# Patient Record
Sex: MALE | Race: OTHER | URBAN - METROPOLITAN AREA
[De-identification: names, ages, dates, MRNs, and addresses within clinical notes are randomized per-mention and may not be internally consistent; named-entity substitution may affect disease eponyms.]

---

## 2017-03-22 NOTE — PATIENT DISCUSSION
Pinguecula Counseling:  I have explained to the patient at length the diagnosis of pinguecula and its pathophysiology. I recommended the patient adequately protect their eyes from excessive UV light and dry, marti conditions. The use of artificial tears in dry conditions was encouraged. Return for follow-up as scheduled.

## 2017-03-22 NOTE — PATIENT DISCUSSION
Pterygium Counseling:  I have explained the diagnosis of pterygium and its pathophysiology. Pterygia are relatively common in the general population and typically have little effect on the vision and eye itself. If the lesion increases in size, it may cause visual symptoms due to induced astigmatism or direct encroachment onto the visual axis. I recommended that the patient use artificial tears to relieve any dryness associated with the pterygium and to increase UV protection. If the pterygium becomes inflamed, uncomfortable or is cosmetically unacceptable, then surgery can be performed to remove the growth. Also, if the pterygium has caused astigmatism, the growth may need to be removed prior to cataract testing and surgery. The recurrence rate after surgery has been explained to the patient along with the need for pathological examination of the pterygium. Return for follow-up as scheduled.

## 2020-01-29 NOTE — PATIENT DISCUSSION
BLEPHARITIS, OU: PRESCRIBE WARM COMPRESSES AND EYELID SCRUBS QD-BID, ARTIFICIAL TEARS BID-QID. RETURN FOR FOLLOW-UP AS SCHEDULED.

## 2021-01-12 NOTE — PATIENT DISCUSSION
Pinguecula Counseling:  I have explained to the patient the diagnosis of pinguecula and its pathophysiology. I recommended the patient adequately protect their eyes from excessive UV light and dry, marti conditions. The use of artificial tears in dry conditions was encouraged. Return for follow-up as scheduled.

## 2021-01-25 NOTE — PATIENT DISCUSSION
CATARACTS, OU - WORSENING/VISUALLY SIGNIFICANT. SCHEDULE _OS_ FIRST THEN LATER IN _OD_ DISCUSSED OPTION OF _STANDARD OU___VS ___PANOPTIX OU_____. PATIENT UNDERSTANDS AND DESIRES ____TO LET US KNOW____.

## 2021-01-25 NOTE — PATIENT DISCUSSION
Surgery Counseling: I have discussed the option of scheduling surgery versus following, as well as the risks, benefits and alternatives of cataract surgery with the patient. It was explained that the surgery is medically indicated at this time, and it can be performed at the patient's option as delaying will cause no further deterioration, therefore there is no rush and there is no harm in waiting to have surgery. It was also explained that there is no guarantee that removing the cataract will improve their vision. The patient understands and desires to proceed with cataract surgery with the implantation of an intraocular lens to improve vision for _WATCHING TV_____. I have given the patient the prescribed regimen of the all-in-one drop to use before and after cataract surgery. They have elected to use the all-in-one option of Pred/Gati/Brom(prednisolone acetate,gatifloxacin,and bromfenac. Patient to administer as directed.

## 2021-02-22 NOTE — PATIENT DISCUSSION
Pre-Op 2nd Eye Counseling: The patient has noticed an improvement in their visual symptoms in the operative eye. The patient complains of decreased vision in the fellow eye when ___DRIVING AT NIGHT____. It was explained to the patient that the decision to proceed with cataract surgery in the fellow eye is entirely a separate decision from the surgical eye. All of the same risks, benefits and alternatives are reviewed with the patient again. The patient does feel the vision in the non-operative eye is limiting their daily activities and elects to proceed with cataract surgery in the ___RIGHT____ eye. . I have given the patient the prescribed regimen of  drops to use before and after cataract surgery. Patient to administer as directed.

## 2021-04-22 ENCOUNTER — IMPORTED ENCOUNTER (OUTPATIENT)
Dept: URBAN - METROPOLITAN AREA CLINIC 9 | Facility: CLINIC | Age: 71
End: 2021-04-22

## 2021-05-04 ENCOUNTER — IMPORTED ENCOUNTER (OUTPATIENT)
Dept: URBAN - METROPOLITAN AREA CLINIC 9 | Facility: CLINIC | Age: 71
End: 2021-05-04

## 2021-05-26 ENCOUNTER — IMPORTED ENCOUNTER (OUTPATIENT)
Dept: URBAN - METROPOLITAN AREA CLINIC 9 | Facility: CLINIC | Age: 71
End: 2021-05-26

## 2021-05-28 ENCOUNTER — IMPORTED ENCOUNTER (OUTPATIENT)
Dept: URBAN - METROPOLITAN AREA CLINIC 9 | Facility: CLINIC | Age: 71
End: 2021-05-28

## 2021-06-23 ENCOUNTER — IMPORTED ENCOUNTER (OUTPATIENT)
Dept: URBAN - METROPOLITAN AREA CLINIC 9 | Facility: CLINIC | Age: 71
End: 2021-06-23

## 2021-10-16 ASSESSMENT — TONOMETRY
OD_IOP_MMHG: 11
OD_IOP_MMHG: 17
OS_IOP_MMHG: 13
OS_IOP_MMHG: 10
OS_IOP_MMHG: 17

## 2021-10-16 ASSESSMENT — VISUAL ACUITY
OS_CC: 20/60 - SN
OS_SC: CF 8' LV
OD_CC: 20/50 - SN
OS_CC: 20/30 -2 SN
OD_CC: 20/70 +2 SN
OS_SC: 20/70 + SN
OD_SC: CF 8' LV
OS_SC: 20/60 - SN
OD_CC: 20/30 -2 SN
OD_SC: 20/30 - SN

## 2021-10-16 ASSESSMENT — KERATOMETRY
OD_AXISANGLE2_DEGREES: 175
OS_K1POWER_DIOPTERS: 42.75
OS_K1POWER_DIOPTERS: 42.75
OD_K1POWER_DIOPTERS: 42.75
OS_AXISANGLE2_DEGREES: 10
OD_AXISANGLE_DEGREES: 80
OD_K2POWER_DIOPTERS: 44.5
OD_K1POWER_DIOPTERS: 43
OD_K2POWER_DIOPTERS: 44.5
OS_AXISANGLE_DEGREES: 100
OS_K1POWER_DIOPTERS: 44.25
OS_AXISANGLE_DEGREES: 1
OD_AXISANGLE_DEGREES: 85
OD_AXISANGLE2_DEGREES: 170
OS_AXISANGLE2_DEGREES: 6
OS_K2POWER_DIOPTERS: 44.25
OS_AXISANGLE2_DEGREES: 91
OS_AXISANGLE_DEGREES: 96
OS_K2POWER_DIOPTERS: 44.25
OS_K2POWER_DIOPTERS: 44.5